# Patient Record
Sex: FEMALE | Race: WHITE
[De-identification: names, ages, dates, MRNs, and addresses within clinical notes are randomized per-mention and may not be internally consistent; named-entity substitution may affect disease eponyms.]

---

## 2018-08-23 ENCOUNTER — HOSPITAL ENCOUNTER (EMERGENCY)
Dept: HOSPITAL 11 - JP.ED | Age: 38
LOS: 1 days | Discharge: NURSING, REHAB OR LONG-TERM CARE | End: 2018-08-24
Payer: MEDICAID

## 2018-08-23 DIAGNOSIS — G03.9: Primary | ICD-10-CM

## 2018-08-23 DIAGNOSIS — Z88.0: ICD-10-CM

## 2018-08-23 DIAGNOSIS — F17.210: ICD-10-CM

## 2018-08-23 DIAGNOSIS — Z88.1: ICD-10-CM

## 2018-08-23 DIAGNOSIS — Z88.8: ICD-10-CM

## 2018-08-23 PROCEDURE — 71046 X-RAY EXAM CHEST 2 VIEWS: CPT

## 2018-08-23 PROCEDURE — 87040 BLOOD CULTURE FOR BACTERIA: CPT

## 2018-08-23 PROCEDURE — 36415 COLL VENOUS BLD VENIPUNCTURE: CPT

## 2018-08-23 PROCEDURE — 96368 THER/DIAG CONCURRENT INF: CPT

## 2018-08-23 PROCEDURE — 80053 COMPREHEN METABOLIC PANEL: CPT

## 2018-08-23 PROCEDURE — 87529 HSV DNA AMP PROBE: CPT

## 2018-08-23 PROCEDURE — 87070 CULTURE OTHR SPECIMN AEROBIC: CPT

## 2018-08-23 PROCEDURE — 87205 SMEAR GRAM STAIN: CPT

## 2018-08-23 PROCEDURE — 96365 THER/PROPH/DIAG IV INF INIT: CPT

## 2018-08-23 PROCEDURE — 96375 TX/PRO/DX INJ NEW DRUG ADDON: CPT

## 2018-08-23 PROCEDURE — 96367 TX/PROPH/DG ADDL SEQ IV INF: CPT

## 2018-08-23 PROCEDURE — 99285 EMERGENCY DEPT VISIT HI MDM: CPT

## 2018-08-23 PROCEDURE — 96361 HYDRATE IV INFUSION ADD-ON: CPT

## 2018-08-23 PROCEDURE — 89050 BODY FLUID CELL COUNT: CPT

## 2018-08-23 PROCEDURE — 81001 URINALYSIS AUTO W/SCOPE: CPT

## 2018-08-23 PROCEDURE — 83605 ASSAY OF LACTIC ACID: CPT

## 2018-08-23 PROCEDURE — 70450 CT HEAD/BRAIN W/O DYE: CPT

## 2018-08-23 PROCEDURE — 84157 ASSAY OF PROTEIN OTHER: CPT

## 2018-08-23 PROCEDURE — 85025 COMPLETE CBC W/AUTO DIFF WBC: CPT

## 2018-08-23 PROCEDURE — 82945 GLUCOSE OTHER FLUID: CPT

## 2018-08-24 VITALS — SYSTOLIC BLOOD PRESSURE: 108 MMHG | DIASTOLIC BLOOD PRESSURE: 53 MMHG

## 2018-08-24 NOTE — CR
CHEST: 2 view

 

CLINICAL HISTORY:Chest pain

 

COMPARISON:2007

 

FINDINGS:  Heart size and pulmonary vascularity is normal. Lung fields are clear..

 

 

IMPRESSION:  No acute cardiopulmonary process or significant change from prior study

## 2018-08-24 NOTE — OR
DATE OF PROCEDURE:  08/23/2018

 

This 38-year-old patient presented to the emergency room with headache, fever, stiff neck.

I have been asked to do a spinal tap.  I discussed the risks and benefits with the patient.

She understands these and an informed consent has been signed.

 

She was placed in a lateral position.  Her back was prepped with Betadine x3.  A 22-gauge

spinal needle was placed at approximately L3-4.  Clear spinal fluid was noted.  Four test

tubes were given, 0.5 to 1 mL of spinal fluid.  Her pressures were then measured and they

measure at 27.  The spinal needle was then removed.  A Band-Aid applied to the puncture

site.  The patient tolerated this procedure well.  She suffered no complaints.

 

NAME OF PROCEDURE:  Spinal tap for diagnosis.

 

The patient's vital signs are stable.  Her color is pink.  She is alert, oriented.  Shows no

signs of complications from the procedure.

 

 

 

 

Elgin Zuleta CRNA

DD:  08/24/2018 00:30:27

DT:  08/24/2018 01:16:10

Job #:  [id number]

## 2018-08-24 NOTE — EDM.PDOC
ED HPI GENERAL MEDICAL PROBLEM





- General


Chief Complaint: General


Stated Complaint: fever, cant walk well, possible lymes


Time Seen by Provider: 18 20:35


Source of Information: Reports: Patient


History Limitations: Reports: No Limitations





- History of Present Illness


INITIAL COMMENTS - FREE TEXT/NARRATIVE: 





Headache for 4 days. Hx migraines but this is worse. Seen in clinic this am and 

put on doxycycline. Hasn't taken any because she can't tolerate it.  Can't take 

po. CBC in clinic (looked hemo-concentrated) and UA were normal.  Lymes test 

sent. She feels horrible and thinks she just can't go on. No specific symptoms 

beside headache. Does have mirella chills but denies fever.


  ** Generalized


Pain Score (Numeric/FACES): 9





- Related Data


 Allergies











Allergy/AdvReac Type Severity Reaction Status Date / Time


 


Penicillins Allergy Severe Rash Verified 18 20:44


 


amoxicillin Allergy  Rash Verified 18 20:44


 


phenobarbital Allergy  Rash Verified 18 20:44











Home Meds: 


 Home Meds





NK [No Known Home Meds]  12/14/15 [History]











Past Medical History


HEENT History: Reports: Impaired Vision


Respiratory History: Reports: Asthma


Genitourinary History: Reports: UTI, Recurrent


OB/GYN History: Reports: Pregnancy


Musculoskeletal History: Reports: Arthritis


Neurological History: Reports: Head Trauma, Migraines, Seizure


Other Neuro History: childhood seizures, out grew them


Psychiatric History: Reports: Anxiety, Bipolar, Depression, Panic Attack, Psych 

Hospitalization(s), PTSD, Suicide Attempt


Endocrine/Metabolic History: Reports: Diabetes, Gestational


Dermatologic History: Reports: Eczema, Other (See Below)


Other Dermatologic History: recent treatment for left thigh abscess started on 

2015 with clinic visit with no new prescriptions or interventions 

completed.  Patient returned to clinic on 2015 with worsening of abscess.

  Patient had ultrasound, I&D with prescription of bactrim PO BID.





- Infectious Disease History


Infectious Disease History: Reports: Chicken Pox





- Past Surgical History


HEENT Surgical History: Reports: Adenoidectomy, Tonsillectomy


GI Surgical History: Reports: Appendectomy


Female  Surgical History: Reports:  Section, Tubal Ligation





Social & Family History





- Tobacco Use


Smoking Status *Q: Current Every Day Smoker


Years of Tobacco use: 30


Packs/Tins Daily: 0.5





- Caffeine Use


Caffeine Use: Reports: Coffee, Soda





- Recreational Drug Use


Recreational Drug Use: No





ED ROS GENERAL





- Review of Systems


Review Of Systems: See Below


Constitutional: Reports: Chills, Malaise


HEENT: Reports: No Symptoms


Respiratory: Reports: No Symptoms


Cardiovascular: Reports: No Symptoms


Endocrine: Reports: No Symptoms


GI/Abdominal: Reports: No Symptoms


: Reports: No Symptoms


Musculoskeletal: Reports: Other (general body aches)


Skin: Reports: No Symptoms


Neurological: Reports: Headache


Psychiatric: Reports: No Symptoms


Hematologic/Lymphatic: Reports: No Symptoms


Immunologic: Reports: No Symptoms





ED EXAM, GENERAL





- Physical Exam


Exam: See Below


Exam Limited By: No Limitations


General Appearance: Alert, WD/WN, Moderate Distress


Eye Exam: Bilateral Eye: EOMI, PERRL


Ears: Normal External Exam, Normal TMs


Nose: Normal Inspection


Throat/Mouth: Normal Inspection, Normal Oropharynx


Head: Atraumatic


Neck: Supple, Other (Not stiff but complains of neck and back pain when flexing 

neck)


Respiratory/Chest: Lungs Clear


Cardiovascular: Regular Rate, Rhythm, No Murmur


Peripheral Pulses: 2+: Radial (L), Radial (R)


GI/Abdominal: Soft, Non-Tender


Back Exam: Normal Inspection


Extremities: Normal Inspection


Neurological: Alert, Oriented, CN II-XII Intact, Normal Cognition, No Motor/

Sensory Deficits


Psychiatric: Normal Affect, Normal Mood


Skin Exam: Warm, Dry, Intact





Course





- Vital Signs


Last Recorded V/S: 





 Last Vital Signs











Temp  37.3 C   18 02:08


 


Pulse  66   18 02:30


 


Resp  16   18 02:30


 


BP  99/48 L  18 02:30


 


Pulse Ox  95   18 02:30














- Orders/Labs/Meds


Orders: 





 Active Orders 24 hr











 Category Date Time Status


 


 Chest 2V [CR] Urgent Exams  18 21:05 Taken


 


 Head wo Cont [CT] Stat Exams  18 23:00 Taken


 


 CULTURE BLOOD [BC] Urgent Lab  18 21:05 Received


 


 CULTURE BLOOD [BC] Urgent Lab  18 21:05 Received


 


 CULTURE CSF + SMEAR [RM] Stat Lab  18 00:15 Results


 


 ENTEROVIRUS RT-PCR Routine Lab  18 04:01 Ordered


 


 HSV 1/2 PCR Routine Lab  18 03:59 Ordered


 


 UA W/MICROSCOPIC [URIN] Urgent Lab  18 22:59 Ordered


 


 WE[place] NILE VIRUS ANTIBODY, CSF Routine Lab  18 04:00 Ordered


 


 Sodium Chloride 0.9% [Saline Flush] Med  18 21:07 Active





 10 ml FLUSH ASDIRECTED PRN   


 


 Vancomycin 1,000 mg Med  18 03:50 Active





 Dextrose 5% in Water 250 ml   





 IV ONETIME   


 


 Blood Culture x2 Reflex Set [OM.PC] Urgent Oth  18 21:06 Ordered


 


 Saline Lock Insert [OM.PC] Urgent Oth  18 21:07 Ordered








 Medication Orders





Vancomycin HCl 1,000 mg/ (Dextrose/Water)  250 mls @ 167 mls/hr IV ONETIME ONE


   Stop: 18 05:19


Sodium Chloride (Saline Flush)  10 ml FLUSH ASDIRECTED PRN


   PRN Reason: Keep Vein Open


   Last Admin: 18 21:48  Dose: 10 ml








Labs: 





 Laboratory Tests











  18 Range/Units





  21:05 21:05 21:05 


 


WBC  7.5    (4.5-11.0)  K/uL


 


RBC  5.28    (3.30-5.50)  M/uL


 


Hgb  16.6 H    (12.0-15.0)  g/dL


 


Hct  47.0    (36.0-48.0)  %


 


MCV  89    (80-98)  fL


 


MCH  31    (27-31)  pg


 


MCHC  35    (32-36)  %


 


Plt Count  198    (150-400)  K/uL


 


Neut % (Auto)  67 H    (36-66)  %


 


Lymph % (Auto)  26    (24-44)  %


 


Mono % (Auto)  6    (2-6)  %


 


Eos % (Auto)  1 L    (2-4)  %


 


Baso % (Auto)  0    (0-1)  %


 


Sodium   130 L   (140-148)  mmol/L


 


Potassium   3.9   (3.6-5.2)  mmol/L


 


Chloride   98 L   (100-108)  mmol/L


 


Carbon Dioxide   20 L   (21-32)  mmol/L


 


Anion Gap   15.9 H   (5.0-14.0)  mmol/L


 


BUN   5 L   (7-18)  mg/dL


 


Creatinine   0.7   (0.6-1.0)  mg/dL


 


Est Cr Clr Drug Dosing   96.13   mL/min


 


Estimated GFR (MDRD)   > 60   (>60)  


 


Glucose   102   ()  mg/dL


 


Lactic Acid    1.1  (0.4-2.0)  mmol/L


 


Calcium   8.5   (8.5-10.1)  mg/dL


 


Total Bilirubin   0.8  D   (0.2-1.0)  mg/dL


 


AST   14 L   (15-37)  U/L


 


ALT   21   (12-78)  U/L


 


Alkaline Phosphatase   74   ()  U/L


 


Total Protein   6.9   (6.4-8.2)  g/dL


 


Albumin   3.4   (3.4-5.0)  g/dL


 


Globulin   3.5   (2.3-3.5)  g/dL


 


Albumin/Globulin Ratio   1.0 L   (1.2-2.2)  


 


Urine Color     


 


Urine Appearance     


 


Urine pH     (4.5-8.0)  


 


Ur Specific Gravity     (1.008-1.030)  


 


Urine Protein     (NEGATIVE)  mg/dL


 


Urine Glucose (UA)     (NEGATIVE)  mg/dL


 


Urine Ketones     (NEGATIVE)  mg/dL


 


Urine Occult Blood     (NEGATIVE)  


 


Urine Nitrite     (NEGATIVE)  


 


Urine Bilirubin     (NEGATIVE)  


 


Urine Urobilinogen     (NORMAL)  mg/dL


 


Ur Leukocyte Esterase     (NEGATIVE)  


 


Urine RBC     (0-5)  


 


Urine WBC     (0-5)  


 


Ur Epithelial Cells     


 


Amorphous Sediment     


 


Urine Bacteria     


 


Urine Mucus     


 


CSF Tube Number     


 


CSF Volume     mls


 


CSF Appearance     (CLEAR)  


 


CSF Color     (COLORLESS)  


 


CSF WBC     (0-5)  /ul


 


CSF RBC     (0-0)  /ul


 


CSF Mononuclear Cells     ()  %


 


CSF Polymorphonuclear     (0-7)  %


 


CSF Glucose     (40-70)  mg/dL


 


CSF Total Protein     (15-45)  mg/dL














  18 Range/Units





  22:59 00:15 00:15 


 


WBC     (4.5-11.0)  K/uL


 


RBC     (3.30-5.50)  M/uL


 


Hgb     (12.0-15.0)  g/dL


 


Hct     (36.0-48.0)  %


 


MCV     (80-98)  fL


 


MCH     (27-31)  pg


 


MCHC     (32-36)  %


 


Plt Count     (150-400)  K/uL


 


Neut % (Auto)     (36-66)  %


 


Lymph % (Auto)     (24-44)  %


 


Mono % (Auto)     (2-6)  %


 


Eos % (Auto)     (2-4)  %


 


Baso % (Auto)     (0-1)  %


 


Sodium     (140-148)  mmol/L


 


Potassium     (3.6-5.2)  mmol/L


 


Chloride     (100-108)  mmol/L


 


Carbon Dioxide     (21-32)  mmol/L


 


Anion Gap     (5.0-14.0)  mmol/L


 


BUN     (7-18)  mg/dL


 


Creatinine     (0.6-1.0)  mg/dL


 


Est Cr Clr Drug Dosing     mL/min


 


Estimated GFR (MDRD)     (>60)  


 


Glucose     ()  mg/dL


 


Lactic Acid     (0.4-2.0)  mmol/L


 


Calcium     (8.5-10.1)  mg/dL


 


Total Bilirubin     (0.2-1.0)  mg/dL


 


AST     (15-37)  U/L


 


ALT     (12-78)  U/L


 


Alkaline Phosphatase     ()  U/L


 


Total Protein     (6.4-8.2)  g/dL


 


Albumin     (3.4-5.0)  g/dL


 


Globulin     (2.3-3.5)  g/dL


 


Albumin/Globulin Ratio     (1.2-2.2)  


 


Urine Color  Yellow    


 


Urine Appearance  Clear    


 


Urine pH  9.0 H    (4.5-8.0)  


 


Ur Specific Gravity  1.020    (1.008-1.030)  


 


Urine Protein  Negative    (NEGATIVE)  mg/dL


 


Urine Glucose (UA)  Normal    (NEGATIVE)  mg/dL


 


Urine Ketones  15 H    (NEGATIVE)  mg/dL


 


Urine Occult Blood  Negative    (NEGATIVE)  


 


Urine Nitrite  Negative    (NEGATIVE)  


 


Urine Bilirubin  Negative    (NEGATIVE)  


 


Urine Urobilinogen  Normal    (NORMAL)  mg/dL


 


Ur Leukocyte Esterase  Negative    (NEGATIVE)  


 


Urine RBC  0-5    (0-5)  


 


Urine WBC  0-5    (0-5)  


 


Ur Epithelial Cells  Rare    


 


Amorphous Sediment  Not seen    


 


Urine Bacteria  Few    


 


Urine Mucus  Not seen    


 


CSF Tube Number   2   


 


CSF Volume   1   mls


 


CSF Appearance   Clear   (CLEAR)  


 


CSF Color   Colorless   (COLORLESS)  


 


CSF WBC   181 H   (0-5)  /ul


 


CSF RBC   4 H   (0-0)  /ul


 


CSF Mononuclear Cells   75   ()  %


 


CSF Polymorphonuclear   25 H   (0-7)  %


 


CSF Glucose    47  (40-70)  mg/dL


 


CSF Total Protein     (15-45)  mg/dL














  18 Range/Units





  00:15 


 


WBC   (4.5-11.0)  K/uL


 


RBC   (3.30-5.50)  M/uL


 


Hgb   (12.0-15.0)  g/dL


 


Hct   (36.0-48.0)  %


 


MCV   (80-98)  fL


 


MCH   (27-31)  pg


 


MCHC   (32-36)  %


 


Plt Count   (150-400)  K/uL


 


Neut % (Auto)   (36-66)  %


 


Lymph % (Auto)   (24-44)  %


 


Mono % (Auto)   (2-6)  %


 


Eos % (Auto)   (2-4)  %


 


Baso % (Auto)   (0-1)  %


 


Sodium   (140-148)  mmol/L


 


Potassium   (3.6-5.2)  mmol/L


 


Chloride   (100-108)  mmol/L


 


Carbon Dioxide   (21-32)  mmol/L


 


Anion Gap   (5.0-14.0)  mmol/L


 


BUN   (7-18)  mg/dL


 


Creatinine   (0.6-1.0)  mg/dL


 


Est Cr Clr Drug Dosing   mL/min


 


Estimated GFR (MDRD)   (>60)  


 


Glucose   ()  mg/dL


 


Lactic Acid   (0.4-2.0)  mmol/L


 


Calcium   (8.5-10.1)  mg/dL


 


Total Bilirubin   (0.2-1.0)  mg/dL


 


AST   (15-37)  U/L


 


ALT   (12-78)  U/L


 


Alkaline Phosphatase   ()  U/L


 


Total Protein   (6.4-8.2)  g/dL


 


Albumin   (3.4-5.0)  g/dL


 


Globulin   (2.3-3.5)  g/dL


 


Albumin/Globulin Ratio   (1.2-2.2)  


 


Urine Color   


 


Urine Appearance   


 


Urine pH   (4.5-8.0)  


 


Ur Specific Gravity   (1.008-1.030)  


 


Urine Protein   (NEGATIVE)  mg/dL


 


Urine Glucose (UA)   (NEGATIVE)  mg/dL


 


Urine Ketones   (NEGATIVE)  mg/dL


 


Urine Occult Blood   (NEGATIVE)  


 


Urine Nitrite   (NEGATIVE)  


 


Urine Bilirubin   (NEGATIVE)  


 


Urine Urobilinogen   (NORMAL)  mg/dL


 


Ur Leukocyte Esterase   (NEGATIVE)  


 


Urine RBC   (0-5)  


 


Urine WBC   (0-5)  


 


Ur Epithelial Cells   


 


Amorphous Sediment   


 


Urine Bacteria   


 


Urine Mucus   


 


CSF Tube Number   


 


CSF Volume   mls


 


CSF Appearance   (CLEAR)  


 


CSF Color   (COLORLESS)  


 


CSF WBC   (0-5)  /ul


 


CSF RBC   (0-0)  /ul


 


CSF Mononuclear Cells   ()  %


 


CSF Polymorphonuclear   (0-7)  %


 


CSF Glucose   (40-70)  mg/dL


 


CSF Total Protein  51.5 H  (15-45)  mg/dL











Meds: 





Medications











Generic Name Dose Route Start Last Admin





  Trade Name Freq  PRN Reason Stop Dose Admin


 


Vancomycin HCl 1,000 mg/  250 mls @ 167 mls/hr  18 03:50  





  Dextrose/Water  IV  18 05:19  





  ONETIME ONE   





     





     





     





     


 


Sodium Chloride  10 ml  18 21:07  18 21:48





  Saline Flush  FLUSH   10 ml





  ASDIRECTED PRN   Administration





  Keep Vein Open   





     





     





     














Discontinued Medications














Generic Name Dose Route Start Last Admin





  Trade Name Freq  PRN Reason Stop Dose Admin


 


Acyclovir  800 mg  18 03:54  





  Zovirax  10 mg/kg (800 mg)  18 03:55  





  IV   





  ONETIME ONE   





     





     





     





     


 


Hydromorphone HCl  1 mg  18 23:46  18 23:52





  Dilaudid  IVPUSH  18 23:47  1 mg





  ONETIME ONE   Administration





     





     





     





     


 


Sodium Chloride  1,000 mls @ 999 mls/hr  18 21:07  18 21:48





  Normal Saline  IV  18 22:07  999 mls/hr





  .BOLUS ONE   Administration





     





     





     





     


 


Sodium Chloride  1,000 mls @ 999 mls/hr  18 21:46  18 23:47





  Normal Saline  IV  18 22:46  Not Given





  .BOLUS ONE   





     





     





     





     


 


Ceftriaxone Sodium 2 gm/  50 mls @ 100 mls/hr  18 03:48  18 04:31





  Sodium Chloride  IV  18 04:17  100 mls/hr





  ONETIME ONE   Administration





     





     





     





     


 


Sodium Chloride  Confirm  18 04:10  18 04:33





  Normal Saline  Administered  18 04:11  Not Given





  Dose   





  250 mls @ as directed   





  .ROUTE   





  .STK-MED ONE   





     





     





     





     


 


Dextrose/Water  Confirm  18 04:11  18 04:33





  Dextrose 5% In Water  Administered  18 04:12  Not Given





  Dose   





  250 mls @ as directed   





  .ROUTE   





  .STK-MED ONE   





     





     





     





     


 


Ondansetron HCl  4 mg  18 21:08  18 21:48





  Zofran Odt  PO  18 21:09  4 mg





  ONETIME ONE   Administration





     





     





     





     


 


Vancomycin HCl  Confirm  18 04:11  18 04:33





  Vancomycin  Administered  18 04:12  Not Given





  Dose   





  1 gm   





  .ROUTE   





  .about.me-[email or web address] ONE   





     





     





     





     














- Radiology Interpretation


Free Text/Narrative:: 





CXR nothing acute





Head CT 10 cm anneurysm in RT MCA area. Unable to view this. Otherwise neg CT








- Re-Assessments/Exams


Free Text/Narrative Re-Assessment/Exam: 





18 04:56


This lady was hydrated. Head CT done.  Seemed LP would be difficult due to 

muscle pain and some obesity so Anesthesia requested to do LP.  This was done 

and showed clear fluid with OP 27 cm





CSF cell count shows 180 WBC's with 75% mononuclears and 25% poly's


slight elevation protein. No bacteria on Gm stain. glucose noted.





Culture sent.





Hostpitalist service felt she shouldn't be cared for here since ICU closed


Discussed with DR Dorsey at [facility] and he accepted patient.  At his 

direction started IV Rocephin, Acyclovir and Vanc. (Pt says she might have had 

a pcn rash as a young child but that's all she recalls)





Additional CSF studies for HSV and enterovirus. QNS fluid for W Nile.








Departure





- Departure


Time of Disposition: 05:03


Disposition: DC/Tfer to Acute Hospital 02


Condition: Serious


Clinical Impression: 


 Meningitis








- Discharge Information


Referrals: 


Anu Hays CNM [Primary Care Provider] - 





- My Orders


Last 24 Hours: 





My Active Orders





18 21:05


Chest 2V [CR] Urgent 


CULTURE BLOOD [BC] Urgent 


CULTURE BLOOD [BC] Urgent 





18 21:06


Blood Culture x2 Reflex Set [OM.PC] Urgent 





18 21:07


Sodium Chloride 0.9% [Saline Flush]   10 ml FLUSH ASDIRECTED PRN 


Saline Lock Insert [OM.PC] Urgent 





18 22:59


UA W/MICROSCOPIC [URIN] Urgent 





18 23:00


Head wo Cont [CT] Stat 





18 00:15


CULTURE CSF + SMEAR [RM] Stat 





18 03:50


Vancomycin 1,000 mg   Dextrose 5% in Water 250 ml IV ONETIME 





18 03:59


HSV 1/2 PCR Routine 





18 04:00


WE[place] VIRUS ANTIBODY, CSF Routine 





18 04:01


ENTEROVIRUS RT-PCR Routine 














- Assessment/Plan


Last 24 Hours: 





My Active Orders





18 21:05


Chest 2V [CR] Urgent 


CULTURE BLOOD [BC] Urgent 


CULTURE BLOOD [BC] Urgent 





18 21:06


Blood Culture x2 Reflex Set [OM.PC] Urgent 





18 21:07


Sodium Chloride 0.9% [Saline Flush]   10 ml FLUSH ASDIRECTED PRN 


Saline Lock Insert [OM.PC] Urgent 





18 22:59


UA W/MICROSCOPIC [URIN] Urgent 





18 23:00


Head wo Cont [CT] Stat 





18 00:15


CULTURE CSF + SMEAR [RM] Stat 





18 03:50


Vancomycin 1,000 mg   Dextrose 5% in Water 250 ml IV ONETIME 





18 03:59


HSV 1/2 PCR Routine 





18 04:00


WE[place] VIRUS ANTIBODY, CSF Routine 





18 04:01


ENTEROVIRUS RT-PCR Routine

## 2018-09-14 ENCOUNTER — HOSPITAL ENCOUNTER (EMERGENCY)
Dept: HOSPITAL 11 - JP.ED | Age: 38
Discharge: OTHER INSTITUTION | End: 2018-09-14
Payer: MEDICAID

## 2018-09-14 VITALS — SYSTOLIC BLOOD PRESSURE: 151 MMHG | DIASTOLIC BLOOD PRESSURE: 93 MMHG

## 2018-09-14 DIAGNOSIS — Z88.0: ICD-10-CM

## 2018-09-14 DIAGNOSIS — Z88.8: ICD-10-CM

## 2018-09-14 DIAGNOSIS — Z88.1: ICD-10-CM

## 2018-09-14 DIAGNOSIS — I67.1: ICD-10-CM

## 2018-09-14 DIAGNOSIS — Z87.891: ICD-10-CM

## 2018-09-14 DIAGNOSIS — I62.9: Primary | ICD-10-CM

## 2018-09-14 PROCEDURE — 96365 THER/PROPH/DIAG IV INF INIT: CPT

## 2018-09-14 PROCEDURE — 70450 CT HEAD/BRAIN W/O DYE: CPT

## 2018-09-14 PROCEDURE — 36415 COLL VENOUS BLD VENIPUNCTURE: CPT

## 2018-09-14 PROCEDURE — 96375 TX/PRO/DX INJ NEW DRUG ADDON: CPT

## 2018-09-14 PROCEDURE — 80048 BASIC METABOLIC PNL TOTAL CA: CPT

## 2018-09-14 PROCEDURE — 85025 COMPLETE CBC W/AUTO DIFF WBC: CPT

## 2018-09-14 PROCEDURE — 99285 EMERGENCY DEPT VISIT HI MDM: CPT

## 2018-09-14 NOTE — EDM.PDOC
ED HPI GENERAL MEDICAL PROBLEM





- General


Chief Complaint: Headache


Stated Complaint: HEADACHE


Time Seen by Provider: 18 18:20


Source of Information: Reports: Patient, Family


History Limitations: Reports: No Limitations





- History of Present Illness


INITIAL COMMENTS - FREE TEXT/NARRATIVE: 





38-year-old female with a known cerebral aneurysm and recent treatment for 

meningitis has had 2 days without headache but redeveloped her headache today. 

No neurologic symptoms other than the headache. She is very anxious and scared, 

they are going to fix the aneurysm but not until the meningitis is treated.


Onset: Sudden (Headache began fairly suddenly earlier this afternoon, 6-7 hours 

ago)


Severity: Moderate


Associated Symptoms: Denies: Nausea/Vomiting


  ** Headache


Pain Score (Numeric/FACES): 10





- Related Data


 Allergies











Allergy/AdvReac Type Severity Reaction Status Date / Time


 


Penicillins Allergy Severe Rash Verified 18 18:22


 


amoxicillin Allergy  Rash Verified 18 18:22


 


phenobarbital Allergy  Rash Verified 18 18:22











Home Meds: 


 Home Meds





NK [No Known Home Meds]  18 [History]











Past Medical History


HEENT History: Reports: Impaired Vision


Respiratory History: Reports: Asthma


Genitourinary History: Reports: UTI, Recurrent


OB/GYN History: Reports: Pregnancy


Musculoskeletal History: Reports: Arthritis


Neurological History: Reports: Head Trauma, Migraines, Seizure


Other Neuro History: childhood seizures, out grew them.  meningitis


Psychiatric History: Reports: Anxiety, Bipolar, Depression, Panic Attack, Psych 

Hospitalization(s), PTSD, Suicide Attempt


Endocrine/Metabolic History: Reports: Diabetes, Gestational


Dermatologic History: Reports: Eczema, Other (See Below)


Other Dermatologic History: recent treatment for left thigh abscess started on 

2015 with clinic visit with no new prescriptions or interventions 

completed.  Patient returned to clinic on 2015 with worsening of abscess.

  Patient had ultrasound, I&D with prescription of bactrim PO BID.





- Infectious Disease History


Infectious Disease History: Reports: Chicken Pox





- Past Surgical History


HEENT Surgical History: Reports: Adenoidectomy, Tonsillectomy


GI Surgical History: Reports: Appendectomy


Female  Surgical History: Reports:  Section, Tubal Ligation





Social & Family History





- Tobacco Use


Smoking Status *Q: Former Smoker


Used Tobacco, but Quit: Yes


Month/Year Tobacco Last Used: 3 weeks ago





- Caffeine Use


Caffeine Use: Reports: Coffee, Soda





- Recreational Drug Use


Recreational Drug Use: No





ED ROS GENERAL





- Review of Systems


Review Of Systems: See Below


Constitutional: Denies: Fever, Chills


HEENT: Denies: Vision Change


Respiratory: Denies: Shortness of Breath


GI/Abdominal: Denies: Abdominal Pain, Nausea, Vomiting


Neurological: Reports: Dizziness, Headache.  Denies: Paresthesia, Change in 

Speech


Psychiatric: Reports: Anxiety





- Physical Exam


Exam: See Below


Exam Limited By: No Limitations


General Appearance: Alert, Anxious


Eye Exam: Bilateral Eye: EOMI


Head Exam: Atraumatic


Respiratory/Chest: No Respiratory Distress


Cardiovascular: Regular Rate, Rhythm


Neuro Exam (Abbreviated): Alert, Oriented, No Motor/Sensory Deficits


Extremities: No: Pedal Edema


Psychiatric: Anxious


Skin Exam: Warm, Dry





Course





- Vital Signs


Last Recorded V/S: 


 Last Vital Signs











Temp  98.0 F   18 19:54


 


Pulse  79   18 19:54


 


Resp  18   18 19:54


 


BP  151/93 H  18 19:54


 


Pulse Ox  98   18 19:54














- Orders/Labs/Meds


Orders: 


 Active Orders 24 hr











 Category Date Time Status


 


 Head wo Cont [CT] Stat Exams  18 18:57 Taken


 


 Saline Lock Insert [OM.PC] Routine Oth  18 19:44 Ordered











Labs: 


 Laboratory Tests











  18 Range/Units





  19:14 19:14 


 


WBC  7.1   (4.5-11.0)  K/uL


 


RBC  5.04   (3.30-5.50)  M/uL


 


Hgb  15.7 H   (12.0-15.0)  g/dL


 


Hct  46.5   (36.0-48.0)  %


 


MCV  92   (80-98)  fL


 


MCH  31   (27-31)  pg


 


MCHC  34   (32-36)  %


 


Plt Count  241   (150-400)  K/uL


 


Neut % (Auto)  56   (36-66)  %


 


Lymph % (Auto)  32   (24-44)  %


 


Mono % (Auto)  7 H   (2-6)  %


 


Eos % (Auto)  3   (2-4)  %


 


Baso % (Auto)  2 H   (0-1)  %


 


Sodium   138 L  (140-148)  mmol/L


 


Potassium   4.1  (3.6-5.2)  mmol/L


 


Chloride   103  (100-108)  mmol/L


 


Carbon Dioxide   28  (21-32)  mmol/L


 


Anion Gap   11.1  (5.0-14.0)  mmol/L


 


BUN   11  D  (7-18)  mg/dL


 


Creatinine   0.7  (0.6-1.0)  mg/dL


 


Est Cr Clr Drug Dosing   96.13  mL/min


 


Estimated GFR (MDRD)   > 60  (>60)  


 


Glucose   94  ()  mg/dL


 


Calcium   9.1  (8.5-10.1)  mg/dL











Meds: 


Medications














Discontinued Medications














Generic Name Dose Route Start Last Admin





  Trade Name Freq  PRN Reason Stop Dose Admin


 


Hydromorphone HCl  0.5 mg  18 19:43  18 19:52





  Dilaudid  IVPUSH  18 19:44  0.5 mg





  ONETIME ONE   Administration





     





     





     





     


 


Nicardipine HCl 25 mg/ Sodium  250 mls @ 50 mls/hr  18 20:00  18 20:

03





  Chloride  IV   5 mg/hr





  TITRATE WILL   50 mls/hr





     Administration





     





  Protocol   





  5 MG/HR   


 


Sodium Chloride  1,000 mls @ 150 mls/hr  18 20:00  18 20:04





  Normal Saline  IV   150 mls/hr





  ASDIRECTED WILL   Administration





     





     





     





     


 


Lorazepam  0.5 mg  18 19:43  18 19:55





  Ativan  IVPUSH  18 19:44  0.5 mg





  ONETIME ONE   Administration





     





     





     





     


 


Sodium Chloride  10 ml  18 19:44  18 19:56





  Saline Flush  FLUSH   10 ml





  ASDIRECTED PRN   Administration





  Keep Vein Open   





     





     





     














- Re-Assessments/Exams


Free Text/Narrative Re-Assessment/Exam: 





18 19:57


The head CT without contrast was obtained and was very concerning for an 

increasing size an aneurysm with a possible small amount of hemorrhage. This 

was discussed with neurosurgery at [facility], and transfer was urgently 

arranged. Labs were stable. She was started on a nicardipine drip to control 

blood pressure, and given 0.5 mg of Dilaudid in 0.5 mg of IV Ativan for pain 

and anxiety. Images were sent to [facility].





Departure





- Departure


Time of Disposition: :18


Disposition: DC/Tfer to Other 70


Condition: Fair


Clinical Impression: 


 Intracranial hemorrhage, Cerebral aneurysm








- Discharge Information


Referrals: 


Anu Hays CNM [Primary Care Provider] - 


Forms:  ED Department Discharge


Care Plan Goals: 


Patient is to be urgently transferred to [facility], to the care of 

neurosurgery for further evaluation and treatment of a cerebral aneurysm with 

the possibility of new hemorrhage.





- My Orders


Last 24 Hours: 


My Active Orders





18 18:57


Head wo Cont [CT] Stat 





18 19:44


Saline Lock Insert [OM.PC] Routine 














- Assessment/Plan


Last 24 Hours: 


My Active Orders





18 18:57


Head wo Cont [CT] Stat 





18 19:44


Saline Lock Insert [OM.PC] Routine

## 2018-10-03 ENCOUNTER — HOSPITAL ENCOUNTER (EMERGENCY)
Dept: HOSPITAL 11 - JP.ED | Age: 38
Discharge: HOME | End: 2018-10-03
Payer: MEDICAID

## 2018-10-03 VITALS — DIASTOLIC BLOOD PRESSURE: 71 MMHG | SYSTOLIC BLOOD PRESSURE: 125 MMHG

## 2018-10-03 DIAGNOSIS — J45.909: ICD-10-CM

## 2018-10-03 DIAGNOSIS — F31.9: ICD-10-CM

## 2018-10-03 DIAGNOSIS — R42: ICD-10-CM

## 2018-10-03 DIAGNOSIS — Z88.1: ICD-10-CM

## 2018-10-03 DIAGNOSIS — G44.209: Primary | ICD-10-CM

## 2018-10-03 DIAGNOSIS — Z88.8: ICD-10-CM

## 2018-10-03 DIAGNOSIS — Z88.0: ICD-10-CM

## 2018-10-03 DIAGNOSIS — Z79.899: ICD-10-CM

## 2018-10-03 DIAGNOSIS — Z87.440: ICD-10-CM

## 2018-10-03 DIAGNOSIS — F41.9: ICD-10-CM

## 2018-10-03 NOTE — EDM.PDOC
ED HPI GENERAL MEDICAL PROBLEM





- General


Chief Complaint: Neurological Problem


Stated Complaint: HEADACHE AFTER SURGERY


Time Seen by Provider: 10/03/18 10:45


Source of Information: Reports: Patient, Family


History Limitations: Reports: No Limitations





- History of Present Illness


INITIAL COMMENTS - FREE TEXT/NARRATIVE: 





38-year-old female with a headache and dizziness, she recently had clipping of 

a cerebral aneurysm. She is worried because she still has persistent headaches 

so went in the clinic, her primary discussed her condition with [place] and sent 

her home, however she then called neuro surgery and they recommended she come 

to [place] to be evaluated. She has no way to get to [place] so she came to the 

emergency room to be evaluated and transferred. She has no neurologic deficits, 

it is mostly just anxiety with persistent headache and dizziness.


Onset: Unknown/Unsure


Associated Symptoms: Reports: Malaise.  Denies: Nausea/Vomiting, Weakness


  ** Headache


Pain Score (Numeric/FACES): 9





- Related Data


 Allergies











Allergy/AdvReac Type Severity Reaction Status Date / Time


 


Penicillins Allergy Severe Rash Verified 10/03/18 10:49


 


amoxicillin Allergy  Rash Verified 10/03/18 10:49


 


phenobarbital Allergy  Rash Verified 10/03/18 10:49











Home Meds: 


 Home Meds





Albuterol [Ventolin HFA] 2 puff INH Q4H 10/03/18 [History]


Gabapentin [Neurontin] 1 tab PO QID 10/03/18 [History]


LORazepam 1 tab PO TID PRN 10/03/18 [History]


PARoxetine HCl [Paroxetine HCl] 1 tab PO DAILY 10/03/18 [History]


amLODIPine Besylate [Amlodipine Besylate] 1 tab PO DAILY 10/03/18 [History]


levETIRAcetam [Levetiracetam] 1 tab PO BID 10/03/18 [History]











Past Medical History


HEENT History: Reports: Impaired Vision


Respiratory History: Reports: Asthma


Genitourinary History: Reports: UTI, Recurrent


OB/GYN History: Reports: Pregnancy


Musculoskeletal History: Reports: Arthritis


Neurological History: Reports: Cerebral Aneurysms, Head Trauma, Migraines, 

Seizure


Other Neuro History: childhood seizures, out grew them.  meningitis


Psychiatric History: Reports: Anxiety, Bipolar, Depression, Panic Attack, Psych 

Hospitalization(s), PTSD, Suicide Attempt


Endocrine/Metabolic History: Reports: Diabetes, Gestational


Dermatologic History: Reports: Eczema, Other (See Below)


Other Dermatologic History: recent treatment for left thigh abscess started on 

2015 with clinic visit with no new prescriptions or interventions 

completed.  Patient returned to clinic on 2015 with worsening of abscess.

  Patient had ultrasound, I&D with prescription of bactrim PO BID.





- Infectious Disease History


Infectious Disease History: Reports: Chicken Pox





- Past Surgical History


HEENT Surgical History: Reports: Adenoidectomy, Tonsillectomy


GI Surgical History: Reports: Appendectomy


Female  Surgical History: Reports:  Section, Tubal Ligation


Neurological Surgical History: Reports: Other (See Below)


Other Neurological Surgeries/Procedures: aneurysm clipped





Social & Family History





- Tobacco Use


Smoking Status *Q: Never Smoker





- Caffeine Use


Caffeine Use: Reports: Coffee, Soda





- Recreational Drug Use


Recreational Drug Use: Yes


Recreational Drug Type: Reports: Marijuana/Hashish


Recreational Drug Use Frequency: Daily





ED ROS GENERAL





- Review of Systems


Review Of Systems: See Below


Constitutional: Reports: Malaise.  Denies: Fever, Chills


HEENT: Denies: Vision Change


Respiratory: Denies: Shortness of Breath


GI/Abdominal: Denies: Vomiting


Skin: Reports: No Symptoms


Neurological: Reports: Dizziness, Headache





ED EXAM, NEURO





- Physical Exam


Exam: See Below


Exam Limited By: No Limitations


General Appearance: Alert, No Apparent Distress


Eye Exam: Bilateral Eye: EOMI, PERRL


Respiratory/Chest: No Respiratory Distress


Neurological: Alert, No Motor/Sensory Deficits, Oriented x 3, Other (Ambulates 

without difficulty)


Psychiatric: Anxious


Skin Exam: Warm, Dry





Course





- Vital Signs


Last Recorded V/S: 


 Last Vital Signs











Temp  95.2 F L  10/03/18 10:48


 


Pulse  82   10/03/18 10:48


 


Resp  16   10/03/18 10:48


 


BP  125/71   10/03/18 10:48


 


Pulse Ox  100   10/03/18 10:48














- Re-Assessments/Exams


Free Text/Narrative Re-Assessment/Exam: 





10/03/18 14:24


Discuss with neurosurgery, a repeat CT scan was recommended. This showed a very 

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 According to 

neurosurgery this would be expected and no further treatment is necessary. She 

was given 10 Vicodin for extra pain control, any further medication should come 

from her primary physician or surgeon. I tried to reassure her that she is 

progressing as expected.





Departure





- Departure


Time of Disposition: 12:55


Disposition: Home, Self-Care 01


Condition: Good


Clinical Impression: 


Headache


Qualifiers:


 Headache type: tension-type Headache chronicity pattern: unspecified pattern 

Intractability: not intractable Qualified Code(s): G44.209 - Tension-type 

headache, unspecified, not intractable








- Discharge Information


Instructions:  General Headache Without Cause


Referrals: 


Anu Hays CNM [Primary Care Provider] - 


Forms:  ED Department Discharge


Care Plan Goals: 


Rest, continue your current medications and stay hydrated. Recheck as scheduled.

## 2018-10-03 NOTE — CT
Head wo Cont

 

CLINICAL HISTORY: Headache, recent surgery 

 

COMPARISON: CT brain 9/14/2018

 

TECHNIQUE: Transverse scans were obtained from the base of the skull through the vertex without IV co
ntrast on a multislice, multidetector CT scanner. Auto dosage reduction and iterative reconstruction 
techniques employed.

 

FINDINGS: Patient is status post right temporoparietal craniotomy. Patient has had recent aneurysm re
pair. There is an aneurysm clip in the left temporal region. There is a small linear dural density wh
ich is likely some residual postoperative blood. There is no mass effect.The basal cisterns and sulci
 over the convexities are normal. The ventricles are normal.

 

IMPRESSION: Status post recent right craniotomy. The linear pleural-based density likely represents r
esidual postop, blood

 

No mass effect is identified

 

If clinical symptomatology persists or worsens, repeat exam should be considered

## 2019-01-21 ENCOUNTER — HOSPITAL ENCOUNTER (EMERGENCY)
Dept: HOSPITAL 11 - JP.ED | Age: 39
Discharge: HOME | End: 2019-01-21
Payer: MEDICAID

## 2019-01-21 VITALS — DIASTOLIC BLOOD PRESSURE: 66 MMHG | SYSTOLIC BLOOD PRESSURE: 105 MMHG

## 2019-01-21 DIAGNOSIS — Z88.8: ICD-10-CM

## 2019-01-21 DIAGNOSIS — Z88.1: ICD-10-CM
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
## 2019-01-21 DIAGNOSIS — S20.319A: Primary | ICD-10-CM

## 2019-01-21 DIAGNOSIS — J45.909: ICD-10-CM

## 2019-01-21 DIAGNOSIS — R56.9: ICD-10-CM

## 2019-01-21 DIAGNOSIS — Z87.891: ICD-10-CM

## 2019-01-21 DIAGNOSIS — Z88.0: ICD-10-CM

## 2019-01-21 PROCEDURE — 70450 CT HEAD/BRAIN W/O DYE: CPT

## 2019-01-21 PROCEDURE — 85379 FIBRIN DEGRADATION QUANT: CPT

## 2019-01-21 PROCEDURE — 84484 ASSAY OF TROPONIN QUANT: CPT

## 2019-01-21 PROCEDURE — 96365 THER/PROPH/DIAG IV INF INIT: CPT

## 2019-01-21 PROCEDURE — 99285 EMERGENCY DEPT VISIT HI MDM: CPT

## 2019-01-21 PROCEDURE — 93005 ELECTROCARDIOGRAM TRACING: CPT

## 2019-01-21 PROCEDURE — 80053 COMPREHEN METABOLIC PANEL: CPT

## 2019-01-21 PROCEDURE — 71046 X-RAY EXAM CHEST 2 VIEWS: CPT

## 2019-01-21 PROCEDURE — 96375 TX/PRO/DX INJ NEW DRUG ADDON: CPT

## 2019-01-21 PROCEDURE — 36415 COLL VENOUS BLD VENIPUNCTURE: CPT

## 2019-01-21 PROCEDURE — 85025 COMPLETE CBC W/AUTO DIFF WBC: CPT

## 2019-01-21 PROCEDURE — 96376 TX/PRO/DX INJ SAME DRUG ADON: CPT

## 2019-01-21 NOTE — EDM.PDOC
ED HPI GENERAL MEDICAL PROBLEM





- General


Chief Complaint: Chest Pain


Stated Complaint: CHEST PAINS


Time Seen by Provider: 19 17:11


Source of Information: Reports: Patient


History Limitations: Reports: No Limitations





- History of Present Illness


INITIAL COMMENTS - FREE TEXT/NARRATIVE: 





This patient complains of chest pain for about the last 2 hours. She said she 

was at home she was just sitting at the table then suddenly she woke up on the 

couch in the living room. She thinks maybe she fell. She doesn't know how she 

got to form one place to the other. She said the pain is severe hurts whenever 

she breathes deeply or she moves if she lays still it doesn't hurt. She has a 

history of asthma. She had a brain aneurysm surgery in 2018 at 

[facility]. She said there some kind of a problem with the brain and 

spinal cord and she's been told she need surgery in the future. She has no 

history of seizures. [place]


  ** Right Chest


Pain Score (Numeric/FACES): 10





- Related Data


 Allergies











Allergy/AdvReac Type Severity Reaction Status Date / Time


 


Penicillins Allergy Severe Rash Verified 10/03/18 10:49


 


amoxicillin Allergy  Rash Verified 10/03/18 10:49


 


phenobarbital Allergy  Rash Verified 10/03/18 10:49














Past Medical History


HEENT History: Reports: Impaired Vision


Respiratory History: Reports: Asthma


Genitourinary History: Reports: UTI, Recurrent


OB/GYN History: Reports: Pregnancy


Musculoskeletal History: Reports: Arthritis


Neurological History: Reports: Cerebral Aneurysms, Head Trauma, Migraines, 

Seizure


Other Neuro History: childhood seizures, out grew them.  meningitis


Psychiatric History: Reports: Anxiety, Bipolar, Depression, Panic Attack, Psych 

Hospitalization(s), PTSD, Suicide Attempt


Endocrine/Metabolic History: Reports: Diabetes, Gestational


Dermatologic History: Reports: Eczema, Other (See Below)


Other Dermatologic History: recent treatment for left thigh abscess started on 

2015 with clinic visit with no new prescriptions or interventions 

completed.  Patient returned to clinic on 2015 with worsening of abscess.

  Patient had ultrasound, I&D with prescription of bactrim PO BID.





- Infectious Disease History


Infectious Disease History: Reports: Chicken Pox





- Past Surgical History


HEENT Surgical History: Reports: Adenoidectomy, Tonsillectomy


GI Surgical History: Reports: Appendectomy


Female  Surgical History: Reports:  Section, Tubal Ligation


Neurological Surgical History: Reports: Other (See Below)


Other Neurological Surgeries/Procedures: aneurysm clipped





Social & Family History





- Tobacco Use


Smoking Status *Q: Former Smoker


Used Tobacco, but Quit: Yes


Month/Year Tobacco Last Used: years ago





- Caffeine Use


Caffeine Use: Reports: Coffee, Soda, Tea





- Recreational Drug Use


Recreational Drug Use: No





ED ROS GENERAL





- Review of Systems


Review Of Systems: See Below


Constitutional: Reports: No Symptoms


HEENT: Reports: No Symptoms


Respiratory: Reports: Other (See history of present illness)


Cardiovascular: Reports: Chest Pain


Endocrine: Reports: No Symptoms


GI/Abdominal: Reports: No Symptoms


: Reports: No Symptoms





ED EXAM, GENERAL





- Physical Exam


Exam: See Below


Exam Limited By: No Limitations


General Appearance: Alert, WD/WN, Anxious, Moderate Distress


Eye Exam: Bilateral Eye: Normal Inspection


Nose: Normal Inspection


Throat/Mouth: Other (Some bruising to the tip of the tongue looks like she may 

have bit)


Neck: Normal Inspection


Respiratory/Chest: No Respiratory Distress, Lungs Clear, Other (Several scratch 

marks to the upper and mid sternum and moderate to severe sternal tenderness)


Cardiovascular: Regular Rate, Rhythm, No Murmur


GI/Abdominal: Soft, Non-Tender (Exam to)


Back Exam: Normal Inspection


Extremities: Normal Inspection


Neurological: Alert, Oriented


Psychiatric: Normal Affect


Skin Exam: Warm, Dry, Other (C start of above)





Course





- Vital Signs


Last Recorded V/S: 


 Last Vital Signs











Temp  37.2 C   19 16:32


 


Pulse  68   19 18:36


 


Resp  10 L  19 18:36


 


BP  107/74   19 18:36


 


Pulse Ox  97   19 18:36














- Orders/Labs/Meds


Orders: 


 Active Orders 24 hr











 Category Date Time Status


 


 EKG Documentation Completion [RC] ASDIRECTED Care  19 17:13 Active


 


 Chest 2V [CR] Urgent Exams  19 17:11 Taken


 


 Head wo Cont [CT] Stat Exams  19 17:12 Taken


 


 HYDROmorphone [Dilaudid] Med  19 18:42 Once





 1 mg IVPUSH ONETIME ONE   


 


 levETIRAcetam [Keppra] 3,000 mg Med  19 18:30 Ordered





 Sodium Chloride 0.9% [Normal Saline] 100 ml   





 IV ONETIME   


 


 EKG 12 Lead [EK] Urgent Ther  19 17:13 Ordered








 Medication Orders





Levetiracetam 3,000 mg/ Sodium (Chloride)  130 mls @ 400 mls/hr IV ONETIME ONE


   Stop: 19 18:44








Labs: 


 Laboratory Tests











  19 Range/Units





  17:11 17:11 17:12 


 


WBC  9.0    (4.5-11.0)  K/uL


 


RBC  5.17    (3.30-5.50)  M/uL


 


Hgb  15.9 H    (12.0-15.0)  g/dL


 


Hct  47.4    (36.0-48.0)  %


 


MCV  92    (80-98)  fL


 


MCH  31    (27-31)  pg


 


MCHC  34    (32-36)  %


 


Plt Count  222    (150-400)  K/uL


 


Neut % (Auto)  75 H    (36-66)  %


 


Lymph % (Auto)  19 L    (24-44)  %


 


Mono % (Auto)  6    (2-6)  %


 


Eos % (Auto)  0 L    (2-4)  %


 


Baso % (Auto)  1    (0-1)  %


 


D-Dimer, Quantitative    306  (0.0-400.0)  ng/mL


 


Sodium   140   (140-148)  mmol/L


 


Potassium   3.7   (3.6-5.2)  mmol/L


 


Chloride   104   (100-108)  mmol/L


 


Carbon Dioxide   27   (21-32)  mmol/L


 


Anion Gap   9.3   (5.0-14.0)  mmol/L


 


BUN   4 L D   (7-18)  mg/dL


 


Creatinine   0.8   (0.6-1.0)  mg/dL


 


Est Cr Clr Drug Dosing   84.11   mL/min


 


Estimated GFR (MDRD)   > 60   (>60)  


 


Glucose   118 H   ()  mg/dL


 


Calcium   8.9   (8.5-10.1)  mg/dL


 


Total Bilirubin   0.4   (0.2-1.0)  mg/dL


 


AST   17   (15-37)  U/L


 


ALT   26   (12-78)  U/L


 


Alkaline Phosphatase   83   ()  U/L


 


Troponin I     (0.000-0.056)  ng/mL


 


Total Protein   7.0   (6.4-8.2)  g/dL


 


Albumin   3.6   (3.4-5.0)  g/dL


 


Globulin   3.4   (2.3-3.5)  g/dL


 


Albumin/Globulin Ratio   1.1 L   (1.2-2.2)  














  19 Range/Units





  17:13 


 


WBC   (4.5-11.0)  K/uL


 


RBC   (3.30-5.50)  M/uL


 


Hgb   (12.0-15.0)  g/dL


 


Hct   (36.0-48.0)  %


 


MCV   (80-98)  fL


 


MCH   (27-31)  pg


 


MCHC   (32-36)  %


 


Plt Count   (150-400)  K/uL


 


Neut % (Auto)   (36-66)  %


 


Lymph % (Auto)   (24-44)  %


 


Mono % (Auto)   (2-6)  %


 


Eos % (Auto)   (2-4)  %


 


Baso % (Auto)   (0-1)  %


 


D-Dimer, Quantitative   (0.0-400.0)  ng/mL


 


Sodium   (140-148)  mmol/L


 


Potassium   (3.6-5.2)  mmol/L


 


Chloride   (100-108)  mmol/L


 


Carbon Dioxide   (21-32)  mmol/L


 


Anion Gap   (5.0-14.0)  mmol/L


 


BUN   (7-18)  mg/dL


 


Creatinine   (0.6-1.0)  mg/dL


 


Est Cr Clr Drug Dosing   mL/min


 


Estimated GFR (MDRD)   (>60)  


 


Glucose   ()  mg/dL


 


Calcium   (8.5-10.1)  mg/dL


 


Total Bilirubin   (0.2-1.0)  mg/dL


 


AST   (15-37)  U/L


 


ALT   (12-78)  U/L


 


Alkaline Phosphatase   ()  U/L


 


Troponin I  < 0.017  (0.000-0.056)  ng/mL


 


Total Protein   (6.4-8.2)  g/dL


 


Albumin   (3.4-5.0)  g/dL


 


Globulin   (2.3-3.5)  g/dL


 


Albumin/Globulin Ratio   (1.2-2.2)  











Meds: 


Medications











Generic Name Dose Route Start Last Admin





  Trade Name Freq  PRN Reason Stop Dose Admin


 


Levetiracetam 3,000 mg/ Sodium  130 mls @ 400 mls/hr  19 18:30  





  Chloride  IV  19 18:44  





  ONETIME ONE   





     





     





     





     














Discontinued Medications














Generic Name Dose Route Start Last Admin





  Trade Name Kitty  PRN Reason Stop Dose Admin


 


Ketorolac Tromethamine  30 mg  19 17:40  19 17:44





  Toradol  IVPUSH  19 17:41  30 mg





  ONETIME ONE   Administration





     





     





     





     














- Radiology Interpretation


Free Text/Narrative:: 





Head CT shows no acute abnormality. There is a curvilinear metallic density 

consistent with an aneurysm clip in the anterior right temporal lobe with 

surrounding encephalomalacia.





- Re-Assessments/Exams


Free Text/Narrative Re-Assessment/Exam: 





19 17:29


EKG shows normal sinus rhythm at 87 bpm there is low voltage with right axis 

deviation no ST or T changes


Free Text/Narrative Re-Assessment/Exam: 





19 18:41


I spoke with Dr. Abernathy the neurologist on call at [facility]. He felt 

that she should be loaded with Keppra 3000 mg been put on 750 mg twice daily. 

He referred me to the interventional neurologist Dr. Dietrich who felt that 

she could be discharged on medications and then follow-up with Dr. Abernathy as 

well as the neurosurgeon within 10 days. Patient agrees to this.





She was medicated with Dilaudid 1 mg IV.





Departure





- Departure


Time of Disposition: 18:42


Disposition: Home, Self-Care 01


Condition: Fair


Clinical Impression: 


 Seizure, Chest wall injury





Referrals: 


PCP,None [Primary Care Provider] - 


Forms:  ED Department Discharge


Additional Instructions: 


For a possible seizure take Keppra 750 mg twice daily. You should follow-up 

with Dr. Abernathy at  as well as the neurosurgeon who did your 

aneurysm surgery. Try to be seen within the next 10 days.





For the chest wall injury you can take Percocet 5/325 one tablet every 4 hours 

as needed. This medication can cause sedation and impaired driving and can lead 

to addiction. Can also take some ibuprofen





- My Orders


Last 24 Hours: 


My Active Orders





19 17:11


Chest 2V [CR] Urgent 





19 17:12


Head wo Cont [CT] Stat 





19 17:13


EKG Documentation Completion [RC] ASDIRECTED 


EKG 12 Lead [EK] Urgent 





19 18:30


levETIRAcetam [Keppra] 3,000 mg   Sodium Chloride 0.9% [Normal Saline] 100 ml 

IV ONETIME 





19 18:42


HYDROmorphone [Dilaudid]   1 mg IVPUSH ONETIME ONE 














- Assessment/Plan


Last 24 Hours: 


My Active Orders





19 17:11


Chest 2V [CR] Urgent 





19 17:12


Head wo Cont [CT] Stat 





19 17:13


EKG Documentation Completion [RC] ASDIRECTED 


EKG 12 Lead [EK] Urgent 





19 18:30


levETIRAcetam [Keppra] 3,000 mg   Sodium Chloride 0.9% [Normal Saline] 100 ml 

IV ONETIME 





19 18:42


HYDROmorphone [Dilaudid]   1 mg IVPUSH ONETIME ONE

## 2019-02-14 ENCOUNTER — HOSPITAL ENCOUNTER (EMERGENCY)
Dept: HOSPITAL 11 - JP.ED | Age: 39
Discharge: HOME | End: 2019-02-14
Payer: MEDICAID

## 2019-02-14 VITALS — SYSTOLIC BLOOD PRESSURE: 144 MMHG | DIASTOLIC BLOOD PRESSURE: 67 MMHG

## 2019-02-14 DIAGNOSIS — Z79.899: ICD-10-CM

## 2019-02-14 DIAGNOSIS — Z87.891: ICD-10-CM

## 2019-02-14 DIAGNOSIS — R51: Primary | ICD-10-CM

## 2019-02-14 DIAGNOSIS — Z88.1: ICD-10-CM

## 2019-02-14 DIAGNOSIS — R56.9: ICD-10-CM

## 2019-02-14 DIAGNOSIS — J45.909: ICD-10-CM

## 2019-02-14 DIAGNOSIS — Z88.0: ICD-10-CM

## 2019-02-14 PROCEDURE — 99284 EMERGENCY DEPT VISIT MOD MDM: CPT

## 2019-02-14 PROCEDURE — 80177 DRUG SCRN QUAN LEVETIRACETAM: CPT

## 2019-02-14 PROCEDURE — 85025 COMPLETE CBC W/AUTO DIFF WBC: CPT

## 2019-02-14 PROCEDURE — 80048 BASIC METABOLIC PNL TOTAL CA: CPT

## 2019-02-14 PROCEDURE — 96361 HYDRATE IV INFUSION ADD-ON: CPT

## 2019-02-14 PROCEDURE — 36415 COLL VENOUS BLD VENIPUNCTURE: CPT

## 2019-02-14 PROCEDURE — 96375 TX/PRO/DX INJ NEW DRUG ADDON: CPT

## 2019-02-14 PROCEDURE — 70450 CT HEAD/BRAIN W/O DYE: CPT

## 2019-02-14 PROCEDURE — 96374 THER/PROPH/DIAG INJ IV PUSH: CPT

## 2019-02-14 NOTE — EDM.PDOC
ED HPI GENERAL MEDICAL PROBLEM





- General


Chief Complaint: Headache


Stated Complaint: HEADACHE


Time Seen by Provider: 19 15:50


Source of Information: Reports: Patient


History Limitations: Reports: No Limitations





- History of Present Illness


INITIAL COMMENTS - FREE TEXT/NARRATIVE: 





pt has a history od a cerebral aneuyism repair done about 4 monthes ago. She 

has had seizures and headaches since the repair. She feels that her headaches 

have  gotten alot worse in the past few days. She thinks she may have had 2 

more seizures since she had the major one. Her headache today is on the rt 

side. She did have some visual blurring also in the rt eye.  


Onset: Gradual, Other ( last 2-3 days. )


Duration: Hour(s):


Location: Reports: Head, Neck


Associated Symptoms: Reports: Headaches


  ** Headache


Pain Score (Numeric/FACES): 10





- Related Data


 Allergies











Allergy/AdvReac Type Severity Reaction Status Date / Time


 


Penicillins Allergy Severe Rash Verified 19 15:38


 


amoxicillin Allergy  Rash Verified 19 15:38


 


phenobarbital Allergy  Rash Verified 19 15:38











Home Meds: 


 Home Meds





levETIRAcetam [Levetiracetam] 750 mg PO BID 19 [History]











Past Medical History


HEENT History: Reports: Impaired Vision


Respiratory History: Reports: Asthma


Gastrointestinal History: Reports: None


Genitourinary History: Reports: UTI, Recurrent


OB/GYN History: Reports: Pregnancy


Musculoskeletal History: Reports: Arthritis


Neurological History: Reports: Cerebral Aneurysms, Head Trauma, Migraines, 

Seizure


Other Neuro History: childhood seizures, out grew them.  meningitis


Psychiatric History: Reports: Anxiety, Bipolar, Depression, Panic Attack, Psych 

Hospitalization(s), PTSD, Suicide Attempt


Endocrine/Metabolic History: Reports: Diabetes, Gestational


Dermatologic History: Reports: Eczema, Other (See Below)


Other Dermatologic History: recent treatment for left thigh abscess started on 

2015 with clinic visit with no new prescriptions or interventions 

completed.  Patient returned to clinic on 2015 with worsening of abscess.

  Patient had ultrasound, I&D with prescription of bactrim PO BID.





- Infectious Disease History


Infectious Disease History: Reports: Chicken Pox





- Past Surgical History


Head Surgeries/Procedures: Reports: None


HEENT Surgical History: Reports: Adenoidectomy, Tonsillectomy


Respiratory Surgical History: Reports: None


GI Surgical History: Reports: Appendectomy


Female  Surgical History: Reports:  Section, Tubal Ligation


Endocrine Surgical History: Reports: None


Neurological Surgical History: Reports: Other (See Below)


Other Neurological Surgeries/Procedures: aneurysm clipped


Musculoskeletal Surgical History: Reports: None


Dermatological Surgical History: Reports: None





Social & Family History





- Family History


Family Medical History: Noncontributory





- Tobacco Use


Smoking Status *Q: Former Smoker


Years of Tobacco use: 20


Packs/Tins Daily: 1


Used Tobacco, but Quit: Yes


Month/Year Tobacco Last Used: 2018


Second Hand Smoke Exposure: No





- Caffeine Use


Caffeine Use: Reports: Coffee, Soda





- Recreational Drug Use


Recreational Drug Use: No





ED ROS GENERAL





- Review of Systems


Review Of Systems: See Below


Constitutional: Reports: No Symptoms


HEENT: Reports: No Symptoms


Respiratory: Reports: No Symptoms


Cardiovascular: Reports: No Symptoms


Endocrine: Reports: No Symptoms


GI/Abdominal: Reports: No Symptoms


: Reports: No Symptoms


Musculoskeletal: Reports: No Symptoms





- Physical Exam


Exam: See Below


Text/Narrative:: 





pt arrived with a severe rt sided headache and blurred vision on the rt. She 

rated her pain at a 10. 


Exam Limited By: No Limitations


General Appearance: Alert, Severe Distress, Other (pupils equal and react to 

lite. )


Ears: Normal TMs


Nose: Normal Inspection


Throat/Mouth: Normal Inspection


Head Exam: Atraumatic


Neck: Other (mild muscle tenderness in the post cervical area. )


Respiratory/Chest: No Respiratory Distress


Cardiovascular: Regular Rate, Rhythm


GI/Abdominal: Soft, Non-Tender


 (Female) Exam: Normal External Exam


Rectal (Female) Exam: Deferred


Neuro Exam (Abbreviated): Alert, Oriented





Course





- Vital Signs


Last Recorded V/S: 


 Last Vital Signs











Temp  36.2 C   19 15:36


 


Pulse  74   19 15:36


 


Resp  16   19 15:36


 


BP  144/67 H  19 15:36


 


Pulse Ox  100   19 15:36














- Orders/Labs/Meds


Orders: 


 Active Orders 24 hr











 Category Date Time Status


 


 LEVETIRACETAM (KEPPRA), S Stat Lab  19 16:05 Received











Labs: 


 Laboratory Tests











  19 Range/Units





  16:05 16:05 


 


WBC   6.8  (4.5-11.0)  K/uL


 


RBC   4.97  (3.30-5.50)  M/uL


 


Hgb   14.8  (12.0-15.0)  g/dL


 


Hct   45.6  (36.0-48.0)  %


 


MCV   92  (80-98)  fL


 


MCH   30  (27-31)  pg


 


MCHC   33  (32-36)  %


 


Plt Count   218  (150-400)  K/uL


 


Neut % (Auto)   62  (36-66)  %


 


Lymph % (Auto)   31  (24-44)  %


 


Mono % (Auto)   5  (2-6)  %


 


Eos % (Auto)   1 L  (2-4)  %


 


Baso % (Auto)   1  (0-1)  %


 


Sodium  140   (140-148)  mmol/L


 


Potassium  4.1   (3.6-5.2)  mmol/L


 


Chloride  105   (100-108)  mmol/L


 


Carbon Dioxide  27   (21-32)  mmol/L


 


Anion Gap  8.3   (5.0-14.0)  mmol/L


 


BUN  8  D   (7-18)  mg/dL


 


Creatinine  0.6   (0.6-1.0)  mg/dL


 


Est Cr Clr Drug Dosing  112.15   mL/min


 


Estimated GFR (MDRD)  > 60   (>60)  


 


Glucose  95   ()  mg/dL


 


Calcium  8.9   (8.5-10.1)  mg/dL











Meds: 


Medications














Discontinued Medications














Generic Name Dose Route Start Last Admin





  Trade Name Freq  PRN Reason Stop Dose Admin


 


Diphenhydramine HCl  50 mg  19 17:06  19 17:22





  Benadryl  IVPUSH  19 17:07  50 mg





  ONETIME ONE   Administration





     





     





     





     


 


Hydromorphone HCl  0.5 mg  19 16:04  19 16:34





  Dilaudid  IVPUSH  19 16:05  0.5 mg





  ONETIME ONE   Administration





     





     





     





     


 


Sodium Chloride  1,000 mls @ 400 mls/hr  19 16:03  19 16:34





  Normal Saline  IV  19 18:32  400 mls/hr





  .BOLUS ONE   Administration





     





     





     





     


 


Ketorolac Tromethamine  30 mg  19 17:05  19 17:22





  Toradol  IVPUSH  19 17:06  30 mg





  ONETIME ONE   Administration





     





     





     





     


 


Ondansetron HCl  4 mg  19 16:03  19 16:34





  Zofran  IVPUSH  19 16:04  4 mg





  ONETIME ONE   Administration





     





     





     





     














- Re-Assessments/Exams


Free Text/Narrative Re-Assessment/Exam: 





19 18:02


lab work was obtained and was normal A keppara level was obtained which will 

rtc later. SHe was given torodol 30mg iv, dilaudid, benadryl for pain. She is 

much more comfortable. She had a cat scan of the head which was normal. --

unchanged from the previos scan. 





Departure





- Departure


Time of Disposition: 18:04


Disposition: Home, Self-Care 01


Condition: Fair


Clinical Impression: 


 Right-sided headache, History of seizure








- Discharge Information


Instructions:  Migraine Headache, Easy-to-Read


Referrals: 


PCP,None [Primary Care Provider] - 


Forms:  ED Department Discharge


Care Plan Goals: 


 rest, tylenol as needed for pain, percocet /32 1 tab for extreme pain  #6





- My Orders


Last 24 Hours: 


My Active Orders





19 16:05


LEVETIRACETAM (KEPPRA), S Stat 














- Assessment/Plan


Last 24 Hours: 


My Active Orders





19 16:05


LEVETIRACETAM (KEPPRA), S Stat

## 2019-02-14 NOTE — CRLCT
Indication:



Severe rt sided headache, blurred vision



Technique:



CT of the head without contrast. 



Comparison:



01/21/2019 CT 



Findings:



Mature right frontal pterional craniotomy flap and curvilinear aneurysm 

clip in the right MCA territory. Stable region of encephalomalacia in the 

right temporal pole. No evidence of suspicious extra-axial collection or 

acute intracranial hemorrhage. No mass effect or midline shift. No 

hydrocephalus. Gray-white matter differentiation is preserved. Stable ex 

vacuo dilation of the right temporal horn. The basal cisterns are clear. 

The visualized orbits, paranasal sinuses, mastoid air cells are clear. 



Impression :



1. No evidence of acute intracranial abnormality.



2. Stable region of encephalomalacia in the right temporal pole. Stable 

mature right frontal pterional craniotomy flap and curvilinear aneurysm 

clip in the right MCA territory.



Please note that all CT scans at this facility use dose modulation, 

iterative reconstruction, and/or weight-based dosing when appropriate to 

reduce radiation dose to as low as reasonably achievable.



Dictated by Tian Arteaga MD @ Feb 14 2019  4:42PM



Signed by Dr. Tian Arteaga @ Feb 14 2019  4:47PM

## 2019-04-09 ENCOUNTER — HOSPITAL ENCOUNTER (EMERGENCY)
Dept: HOSPITAL 11 - JP.ED | Age: 39
Discharge: HOME | End: 2019-04-09
Payer: MEDICAID

## 2019-04-09 VITALS — DIASTOLIC BLOOD PRESSURE: 70 MMHG | SYSTOLIC BLOOD PRESSURE: 97 MMHG

## 2019-04-09 DIAGNOSIS — G40.909: Primary | ICD-10-CM

## 2019-04-09 DIAGNOSIS — Z79.899: ICD-10-CM

## 2019-04-09 DIAGNOSIS — Z88.8: ICD-10-CM

## 2019-04-09 DIAGNOSIS — Z88.0: ICD-10-CM

## 2019-04-09 PROCEDURE — 96374 THER/PROPH/DIAG INJ IV PUSH: CPT

## 2019-04-09 PROCEDURE — 85025 COMPLETE CBC W/AUTO DIFF WBC: CPT

## 2019-04-09 PROCEDURE — 80048 BASIC METABOLIC PNL TOTAL CA: CPT

## 2019-04-09 PROCEDURE — 80177 DRUG SCRN QUAN LEVETIRACETAM: CPT

## 2019-04-09 PROCEDURE — 36415 COLL VENOUS BLD VENIPUNCTURE: CPT

## 2019-04-09 PROCEDURE — 99285 EMERGENCY DEPT VISIT HI MDM: CPT

## 2019-04-09 NOTE — EDM.PDOC
ED HPI GENERAL MEDICAL PROBLEM





- General


Chief Complaint: Neurological Problem


Stated Complaint: MEDICAL


Time Seen by Provider: 19 19:35


Source of Information: Reports: Patient, EMS


History Limitations: Reports: No Limitations





- History of Present Illness


INITIAL COMMENTS - FREE TEXT/NARRATIVE: 





38-year-old female with a long-standing seizure disorder missed her morning 

seizure medicine, and while watching TV this evening with her children she had 

a generalized seizure. She was not incontinent but she did bite the tip of her 

tongue. She started gaining consciousness and coming out of her postictal state 

about the time the ambulance arrived. By the time she got to the ER she was 

back to baseline. Her last seizure was a month ago, she's had several this year.


Onset: Sudden


Duration: Hour(s): (Within the last hour)


Associated Symptoms: Reports: Confusion (Postictal).  Denies: Fever/Chills, 

Headaches, Malaise, Nausea/Vomiting, Shortness of Breath





- Related Data


 Allergies











Allergy/AdvReac Type Severity Reaction Status Date / Time


 


Penicillins Allergy Severe Rash Verified 19 19:32


 


amoxicillin Allergy  Rash Verified 19 19:32


 


phenobarbital Allergy  Rash Verified 19 19:32











Home Meds: 


 Home Meds





levETIRAcetam [Levetiracetam] 750 mg PO BID 19 [History]











Past Medical History


HEENT History: Reports: Impaired Vision


Respiratory History: Reports: Asthma


Gastrointestinal History: Reports: None


Genitourinary History: Reports: UTI, Recurrent


OB/GYN History: Reports: Pregnancy


Musculoskeletal History: Reports: Arthritis


Neurological History: Reports: Cerebral Aneurysms, Head Trauma, Migraines, 

Seizure


Other Neuro History: childhood seizures, out grew them.  meningitis


Psychiatric History: Reports: Anxiety, Bipolar, Depression, Panic Attack, Psych 

Hospitalization(s), PTSD, Suicide Attempt


Endocrine/Metabolic History: Reports: Diabetes, Gestational


Dermatologic History: Reports: Eczema, Other (See Below)


Other Dermatologic History: recent treatment for left thigh abscess started on 

2015 with clinic visit with no new prescriptions or interventions 

completed.  Patient returned to clinic on 2015 with worsening of abscess.

  Patient had ultrasound, I&D with prescription of bactrim PO BID.





- Infectious Disease History


Infectious Disease History: Reports: Chicken Pox





- Past Surgical History


Head Surgeries/Procedures: Reports: None


HEENT Surgical History: Reports: Adenoidectomy, Tonsillectomy


Respiratory Surgical History: Reports: None


GI Surgical History: Reports: Appendectomy


Female  Surgical History: Reports:  Section, Tubal Ligation


Endocrine Surgical History: Reports: None


Neurological Surgical History: Reports: Other (See Below)


Other Neurological Surgeries/Procedures: aneurysm clipped


Musculoskeletal Surgical History: Reports: None


Dermatological Surgical History: Reports: None





Social & Family History





- Family History


Family Medical History: Noncontributory





- Caffeine Use


Caffeine Use: Reports: Coffee, Soda





ED ROS GENERAL





- Review of Systems


Review Of Systems: See Below


Constitutional: Denies: Fever, Chills


HEENT: Reports: No Symptoms


Respiratory: Denies: Shortness of Breath


Cardiovascular: Denies: Chest Pain


GI/Abdominal: Denies: Abdominal Pain, Nausea, Vomiting


Neurological: Reports: Confusion.  Denies: Headache





- Physical Exam


Exam: See Below


Exam Limited By: No Limitations


General Appearance: Alert, No Apparent Distress, Other (Very emotional, tearful)


Eye Exam: Bilateral Eye: EOMI, Normal Inspection


Throat/Mouth: Other (Slight contusion of the tip of the tongue, no laceration)


Head Exam: Atraumatic


Neck: Supple, Non-Tender


Respiratory/Chest: No Respiratory Distress


Neuro Exam (Abbreviated): Alert, Oriented, No Motor/Sensory Deficits


Psychiatric: Depressed Mood, Other (Very anxious)


Skin Exam: Warm, Dry





Course





- Vital Signs


Last Recorded V/S: 


 Last Vital Signs











Temp  97.3 F   19 19:59


 


Pulse  105 H  19 20:47


 


Resp  14   19 20:47


 


BP  97/70   19 20:47


 


Pulse Ox  98   19 20:47














- Orders/Labs/Meds


Orders: 


 Active Orders 24 hr











 Category Date Time Status


 


 LEVETIRACETAM (KEPPRA), S Stat Lab  19 20:07 Received











Labs: 


 Laboratory Tests











  19 Range/Units





  20:00 20:00 


 


WBC  7.6   (4.5-11.0)  K/uL


 


RBC  4.98   (3.30-5.50)  M/uL


 


Hgb  15.4 H   (12.0-15.0)  g/dL


 


Hct  45.5   (36.0-48.0)  %


 


MCV  91   (80-98)  fL


 


MCH  31   (27-31)  pg


 


MCHC  34   (32-36)  %


 


Plt Count  159   (150-400)  K/uL


 


Neut % (Auto)  52   (36-66)  %


 


Lymph % (Auto)  37   (24-44)  %


 


Mono % (Auto)  6   (2-6)  %


 


Eos % (Auto)  5 H   (2-4)  %


 


Baso % (Auto)  1   (0-1)  %


 


Sodium   139 L  (140-148)  mmol/L


 


Potassium   4.1  (3.6-5.2)  mmol/L


 


Chloride   104  (100-108)  mmol/L


 


Carbon Dioxide   23  (21-32)  mmol/L


 


Anion Gap   16.1 H  (5.0-14.0)  mmol/L


 


BUN   7  (7-18)  mg/dL


 


Creatinine   0.8  (0.6-1.0)  mg/dL


 


Est Cr Clr Drug Dosing   84.11  mL/min


 


Estimated GFR (MDRD)   > 60  (>60)  


 


Glucose   103  ()  mg/dL


 


Calcium   8.7  (8.5-10.1)  mg/dL











Meds: 


Medications














Discontinued Medications














Generic Name Dose Route Start Last Admin





  Trade Name Freq  PRN Reason Stop Dose Admin


 


Levetiracetam 500 mg/ Sodium  105 mls @ 400 mls/hr  19 19:46  19 20:

22





  Chloride  IV  19 20:00  400 mls/hr





  ONETIME ONE   Administration





     





     





     





     














- Re-Assessments/Exams


Free Text/Narrative Re-Assessment/Exam: 





19 20:54


BMP CBC and Keppra level were obtained. BMP and CBC were reassuring, she was 

given 500 mg of IV Keppra to replace the dose she missed this morning. Return 

to her regular medications and her Keppra level will be related to her when it'

s available. She may want to consider a neuro consult to discuss whether 

additional medicine will be necessary.





Departure





- Departure


Time of Disposition: 21:07


Disposition: Home, Self-Care 01


Condition: Good


Clinical Impression: 


 Seizure








- Discharge Information


Instructions:  Seizure, Adult, Easy-to-Read


Referrals: 


PCP,None [Primary Care Provider] - 


Forms:  ED Department Discharge


Care Plan Goals: 


Continue your current medications, return at any time if seizures are recurring 

and consider seeing her neurologist to discuss any change in medications. We 

will inform you of your Keppra level when it's available.





- My Orders


Last 24 Hours: 


My Active Orders





19 20:07


LEVETIRACETAM (KEPPRA), S Stat 














- Assessment/Plan


Last 24 Hours: 


My Active Orders





19 20:07


LEVETIRACETAM (KEPPRA), S Stat

## 2019-04-27 ENCOUNTER — HOSPITAL ENCOUNTER (EMERGENCY)
Dept: HOSPITAL 11 - JP.ED | Age: 39
Discharge: HOME | End: 2019-04-27
Payer: MEDICAID

## 2019-04-27 VITALS — SYSTOLIC BLOOD PRESSURE: 123 MMHG | DIASTOLIC BLOOD PRESSURE: 79 MMHG

## 2019-04-27 DIAGNOSIS — F41.9: ICD-10-CM

## 2019-04-27 DIAGNOSIS — G40.909: Primary | ICD-10-CM

## 2019-04-27 DIAGNOSIS — J45.909: ICD-10-CM

## 2019-04-27 DIAGNOSIS — Z88.1: ICD-10-CM

## 2019-04-27 DIAGNOSIS — F31.9: ICD-10-CM

## 2019-04-27 DIAGNOSIS — Z79.899: ICD-10-CM

## 2019-04-27 DIAGNOSIS — Z88.0: ICD-10-CM

## 2019-04-27 PROCEDURE — 85025 COMPLETE CBC W/AUTO DIFF WBC: CPT

## 2019-04-27 PROCEDURE — 99284 EMERGENCY DEPT VISIT MOD MDM: CPT

## 2019-04-27 PROCEDURE — 80177 DRUG SCRN QUAN LEVETIRACETAM: CPT

## 2019-04-27 PROCEDURE — 70450 CT HEAD/BRAIN W/O DYE: CPT

## 2019-04-27 PROCEDURE — 96375 TX/PRO/DX INJ NEW DRUG ADDON: CPT

## 2019-04-27 PROCEDURE — 36415 COLL VENOUS BLD VENIPUNCTURE: CPT

## 2019-04-27 PROCEDURE — 80053 COMPREHEN METABOLIC PANEL: CPT

## 2019-04-27 PROCEDURE — 96374 THER/PROPH/DIAG INJ IV PUSH: CPT

## 2019-04-27 NOTE — CRLCT
INDICATION:



Seizure.



TECHNIQUE:



CT Head without contrast.



COMPARISON:



02/14/2019. 



FINDINGS:



Sequelae of right frontal pterional craniotomy. 



Curvilinear metallic aneurysm clips in the right MCA territory. 



Stable encephalomalacia in the right temporal lobe. 



No hydrocephalus. Stable mild ex vacuo dilatation of the temporal horn of 

the right lateral ventricle. 



No acute intracranial hemorrhage. No midline shift. 



No acute skull fracture. 



Visualized paranasal sinuses and mastoid air cells are clear. 



IMPRESSION:



Stable exam without acute intracranial abnormality.



Dictated by Nikolay Lama MD @ 4/27/2019 5:33:51 PM



Please note that all CT scans at this facility use dose modulation, 

iterative reconstruction, and/or weight-based dosing when appropriate to 

reduce radiation dose to as low as reasonably achievable.



Dictated by: Nikolay Lama MD @ 04/27/2019 17:33:56



(Electronically Signed)

## 2019-04-27 NOTE — EDM.PDOC
ED HPI GENERAL MEDICAL PROBLEM





- General


Chief Complaint: Neurological Problem


Stated Complaint: PASSED OUT


Time Seen by Provider: 19 16:47


Source of Information: Reports: Patient


History Limitations: Reports: No Limitations





- History of Present Illness


INITIAL COMMENTS - FREE TEXT/NARRATIVE: 





pt is on keppara. She has had several seizures, The last was about 2 weeks ago. 

She had a keppara level at that time which was on the lower side.--12. 


Onset: Today, Sudden, Other (pt found herself on the floor. )


Duration: Minutes:


Location: Reports: Generalized


Associated Symptoms: Reports: No Other Symptoms


  ** Headache


Pain Score (Numeric/FACES): 8





- Related Data


 Allergies











Allergy/AdvReac Type Severity Reaction Status Date / Time


 


Penicillins Allergy Severe Rash Verified 19 16:48


 


amoxicillin Allergy  Rash Verified 19 16:48


 


phenobarbital Allergy  Rash Verified 19 16:48











Home Meds: 


 Home Meds





levETIRAcetam [Levetiracetam] 750 mg PO BID 19 [History]











Past Medical History


HEENT History: Reports: Impaired Vision


Respiratory History: Reports: Asthma


Gastrointestinal History: Reports: None


Genitourinary History: Reports: UTI, Recurrent


OB/GYN History: Reports: Pregnancy


Musculoskeletal History: Reports: Arthritis


Neurological History: Reports: Cerebral Aneurysms, Head Trauma, Migraines, 

Seizure


Other Neuro History: childhood seizures, out grew them.  meningitis


Psychiatric History: Reports: Anxiety, Bipolar, Depression, Panic Attack, Psych 

Hospitalization(s), PTSD, Suicide Attempt


Endocrine/Metabolic History: Reports: Diabetes, Gestational


Dermatologic History: Reports: Eczema, Other (See Below)


Other Dermatologic History: recent treatment for left thigh abscess started on 

2015 with clinic visit with no new prescriptions or interventions 

completed.  Patient returned to clinic on 2015 with worsening of abscess.

  Patient had ultrasound, I&D with prescription of bactrim PO BID.





- Infectious Disease History


Infectious Disease History: Reports: Chicken Pox





- Past Surgical History


Head Surgeries/Procedures: Reports: None


HEENT Surgical History: Reports: Adenoidectomy, Tonsillectomy


Respiratory Surgical History: Reports: None


GI Surgical History: Reports: Appendectomy


Female  Surgical History: Reports:  Section, Tubal Ligation


Endocrine Surgical History: Reports: None


Neurological Surgical History: Reports: Other (See Below)


Other Neurological Surgeries/Procedures: aneurysm clipped


Musculoskeletal Surgical History: Reports: None


Dermatological Surgical History: Reports: None





Social & Family History





- Family History


Family Medical History: Noncontributory





- Caffeine Use


Caffeine Use: Reports: Coffee, Soda





ED ROS GENERAL





- Review of Systems


Review Of Systems: See Below


Constitutional: Reports: No Symptoms


HEENT: Reports: No Symptoms


Respiratory: Reports: No Symptoms


Cardiovascular: Reports: No Symptoms


Endocrine: Reports: No Symptoms


GI/Abdominal: Reports: No Symptoms


: Reports: No Symptoms


Musculoskeletal: Reports: No Symptoms


Skin: Reports: No Symptoms


Neurological: Reports: Headache, Seizure


Psychiatric: Reports: No Symptoms





- Physical Exam


Exam: See Below


Text/Narrative:: 


pt arrived after having a generalized seizure. She is on keppara. She had a 

cerebral aneuyism repaired a few monthes ago. She has had seizure activity 

since that time.   





Exam Limited By: No Limitations


General Appearance: Alert, No Apparent Distress, Other (pupils are equal and 

reactive. )


Ears: Normal TMs


Nose: Normal Inspection


Throat/Mouth: Normal Inspection


Head Exam: Atraumatic


Neck: Normal Inspection


Respiratory/Chest: No Respiratory Distress


Cardiovascular: Regular Rate, Rhythm


GI/Abdominal: Soft, Non-Tender


 (Female) Exam: Deferred


Rectal (Female) Exam: Deferred


Neuro Exam (Abbreviated): Alert, Oriented, Normal Cognition


Back Exam: Normal Inspection


Extremities: Normal Inspection


Psychiatric: Tearful





Course





- Vital Signs


Last Recorded V/S: 


 Last Vital Signs











Temp  36.7 C   19 16:44


 


Pulse  52 L  19 17:27


 


Resp  16   19 17:27


 


BP  116/59 L  19 17:27


 


Pulse Ox  95   19 17:27














- Orders/Labs/Meds


Orders: 


 Active Orders 24 hr











 Category Date Time Status


 


 LEVETIRACETAM (KEPPRA), S Stat Lab  19 17:10 Received











Labs: 


 Laboratory Tests











  19 Range/Units





  16:43 16:43 


 


WBC  8.3   (4.5-11.0)  K/uL


 


RBC  5.02   (3.30-5.50)  M/uL


 


Hgb  15.2 H   (12.0-15.0)  g/dL


 


Hct  46.2   (36.0-48.0)  %


 


MCV  92   (80-98)  fL


 


MCH  30   (27-31)  pg


 


MCHC  33   (32-36)  %


 


Plt Count  223   (150-400)  K/uL


 


Neut % (Auto)  59   (36-66)  %


 


Lymph % (Auto)  33   (24-44)  %


 


Mono % (Auto)  6   (2-6)  %


 


Eos % (Auto)  2   (2-4)  %


 


Baso % (Auto)  1   (0-1)  %


 


Sodium   140  (140-148)  mmol/L


 


Potassium   4.3  (3.6-5.2)  mmol/L


 


Chloride   104  (100-108)  mmol/L


 


Carbon Dioxide   25  (21-32)  mmol/L


 


Anion Gap   11.2  (5.0-14.0)  mmol/L


 


BUN   7  (7-18)  mg/dL


 


Creatinine   0.7  (0.6-1.0)  mg/dL


 


Est Cr Clr Drug Dosing   96.13  mL/min


 


Estimated GFR (MDRD)   > 60  (>60)  


 


Glucose   93  ()  mg/dL


 


Calcium   8.7  (8.5-10.1)  mg/dL


 


Total Bilirubin   0.4  (0.2-1.0)  mg/dL


 


AST   14 L  (15-37)  U/L


 


ALT   15  (12-78)  U/L


 


Alkaline Phosphatase   89  ()  U/L


 


Total Protein   6.9  (6.4-8.2)  g/dL


 


Albumin   3.3 L  (3.4-5.0)  g/dL


 


Globulin   3.6 H  (2.3-3.5)  g/dL


 


Albumin/Globulin Ratio   0.9 L  (1.2-2.2)  











Meds: 


Medications














Discontinued Medications














Generic Name Dose Route Start Last Admin





  Trade Name Freq  PRN Reason Stop Dose Admin


 


Hydromorphone HCl  1 mg  19 16:49  19 17:21





  Dilaudid  IVPUSH  19 16:50  1 mg





  ONETIME ONE   Administration





     





     





     





     


 


Levetiracetam 500 mg/ Sodium  105 mls @ 400 mls/hr  19 16:46  19 17:

22





  Chloride  IV  19 17:00  400 mls/hr





  ONETIME ONE   Administration





     





     





     





     














- Re-Assessments/Exams


Free Text/Narrative Re-Assessment/Exam: 





19 17:50


cat scan of the head did not show any change. She was given keppara 500mg iv. 

She did have a borderline low level in the past. 





Departure





- Departure


Time of Disposition: 17:50


Disposition: Home, Self-Care 01


Condition: Fair


Clinical Impression: 


 Seizure cerebral








- Discharge Information


Referrals: 


PCP,None [Primary Care Provider] - 


Forms:  ED Department Discharge


Care Plan Goals: 


increase keppara to 1000 mg bid, a level is pending. try to eat something 

earlier in the day. 





- My Orders


Last 24 Hours: 


My Active Orders





19 17:10


LEVETIRACETAM (KEPPRA), S Stat 














- Assessment/Plan


Last 24 Hours: 


My Active Orders





19 17:10


LEVETIRACETAM (KEPPRA), S Stat